# Patient Record
Sex: MALE | Race: WHITE | Employment: FULL TIME | URBAN - METROPOLITAN AREA
[De-identification: names, ages, dates, MRNs, and addresses within clinical notes are randomized per-mention and may not be internally consistent; named-entity substitution may affect disease eponyms.]

---

## 2021-05-02 ENCOUNTER — HOSPITAL ENCOUNTER (EMERGENCY)
Age: 26
Discharge: HOME | End: 2021-05-02
Payer: COMMERCIAL

## 2021-05-02 VITALS
TEMPERATURE: 97.8 F | WEIGHT: 160 LBS | RESPIRATION RATE: 16 BRPM | HEART RATE: 69 BPM | DIASTOLIC BLOOD PRESSURE: 77 MMHG | SYSTOLIC BLOOD PRESSURE: 108 MMHG | OXYGEN SATURATION: 98 %

## 2021-05-02 DIAGNOSIS — H61.21 IMPACTED CERUMEN OF RIGHT EAR: Primary | ICD-10-CM

## 2021-05-02 PROCEDURE — 99211 OFF/OP EST MAY X REQ PHY/QHP: CPT

## 2021-05-02 NOTE — ED PROVIDER NOTES
HPI:  5/2/21,   Time: 2:23 PM EDT         Maggie Bearden is a 26 y.o. male presenting to the ED for complaining of bilateral ear pain, beginning 1 year ago. The complaint has been persistent, mild in severity, and worsened by nothing. Complaining of bilateral ear pain which he states began approximate 1 year ago. He does wear earplugs at work. He states he had water in his ear a while ago but states he does not feel that the water ever left the ear. He continues to have pressure and discomfort to the bilateral ears with the right being worse than the left. ROS:   Pertinent positives and negatives are stated within HPI, all other systems reviewed and are negative.  --------------------------------------------- PAST HISTORY ---------------------------------------------  Past Medical History:  has no past medical history on file. Past Surgical History:  has a past surgical history that includes fracture surgery. Social History:  reports that he has been smoking cigarettes. He has been smoking about 1.00 pack per day. He does not have any smokeless tobacco history on file. He reports current drug use. Frequency: 2.00 times per week. Drug: Marijuana. He reports that he does not drink alcohol. Family History: family history is not on file. The patients home medications have been reviewed. Allergies: Patient has no known allergies. -------------------------------------------------- RESULTS -------------------------------------------------  All laboratory and radiology results have been personally reviewed by myself   LABS:  No results found for this visit on 05/02/21. RADIOLOGY:  Interpreted by Radiologist.  No orders to display       ------------------------- NURSING NOTES AND VITALS REVIEWED ---------------------------   The nursing notes within the ED encounter and vital signs as below have been reviewed.    /77   Pulse 69   Temp 97.8 °F (36.6 °C)   Resp 16   Wt 160 lb (72.6 kg)   SpO2 98%   Oxygen Saturation Interpretation: Normal      ---------------------------------------------------PHYSICAL EXAM--------------------------------------      Constitutional/General: Alert and oriented x3, well appearing, non toxic in NAD  Head: NC/AT  Eyes: PERRL, EOMI  Mouth: Oropharynx clear, handling secretions, no trismus. The right tympanic membrane is unable to be visualized secondary to large cerumen impaction. The left TM is of normal appearance with good cone of light. No visible erythema or exudate noted. Neck: Supple, full ROM, no meningeal signs  Pulmonary: Lungs clear to auscultation bilaterally, no wheezes, rales, or rhonchi. Not in respiratory distress  Cardiovascular:  Regular rate and rhythm, no murmurs, gallops, or rubs. 2+ distal pulses  Abdomen: Soft, non tender, non distended,   Extremities: Moves all extremities x 4. Warm and well perfused  Skin: warm and dry without rash  Neurologic: GCS 15,  Psych: Normal Affect      ------------------------------ ED COURSE/MEDICAL DECISION MAKING----------------------  Medications - No data to display      Medical Decision Making: Will be started on Debrox drops to aid in the removal and breakdown of the cerumen to the right ear. Advised to follow-up with ENT for additional evaluation. Counseling: The emergency provider has spoken with the patient and discussed todays results, in addition to providing specific details for the plan of care and counseling regarding the diagnosis and prognosis. Questions are answered at this time and they are agreeable with the plan.      --------------------------------- IMPRESSION AND DISPOSITION ---------------------------------    IMPRESSION  1.  Impacted cerumen of right ear        DISPOSITION  Disposition: Discharge to home  Patient condition is good                 BRANDON Natarajan - CNP  05/02/21 1639

## 2021-06-02 ENCOUNTER — TELEPHONE (OUTPATIENT)
Dept: ADMINISTRATIVE | Age: 26
End: 2021-06-02

## 2021-06-02 NOTE — TELEPHONE ENCOUNTER
Seen in [facility] 5/2 for BL ear pains and cerumen impaction RT ear. Calling to schedule follow up with Dr. Adelina Hernandez. LT ear started with discharge last week and also noticed a small amount of blood draining from ear. No pain    Please advise on scheduling.

## 2021-06-07 ENCOUNTER — TELEPHONE (OUTPATIENT)
Dept: FAMILY MEDICINE CLINIC | Age: 26
End: 2021-06-07

## 2021-06-10 NOTE — TELEPHONE ENCOUNTER
LM regarding apt. Patient has not called back to schedule. Will not be reaching out to patient any longer.